# Patient Record
Sex: MALE | Employment: UNEMPLOYED | URBAN - METROPOLITAN AREA
[De-identification: names, ages, dates, MRNs, and addresses within clinical notes are randomized per-mention and may not be internally consistent; named-entity substitution may affect disease eponyms.]

---

## 2020-06-10 DIAGNOSIS — Z12.11 SCREEN FOR COLON CANCER: ICD-10-CM

## 2020-06-10 LAB
A/G RATIO: 1.1 (ref 1.1–2.2)
ALBUMIN SERPL-MCNC: 3.6 G/DL (ref 3.4–5)
ALP BLD-CCNC: 65 U/L (ref 40–129)
ALT SERPL-CCNC: 79 U/L (ref 10–40)
ANION GAP SERPL CALCULATED.3IONS-SCNC: 14 MMOL/L (ref 3–16)
AST SERPL-CCNC: 121 U/L (ref 15–37)
BASOPHILS ABSOLUTE: 0.1 K/UL (ref 0–0.2)
BASOPHILS RELATIVE PERCENT: 0.6 %
BILIRUB SERPL-MCNC: 0.7 MG/DL (ref 0–1)
BUN BLDV-MCNC: 4 MG/DL (ref 7–20)
CALCIUM SERPL-MCNC: 8.5 MG/DL (ref 8.3–10.6)
CHLORIDE BLD-SCNC: 102 MMOL/L (ref 99–110)
CHOLESTEROL, TOTAL: 164 MG/DL (ref 0–199)
CO2: 22 MMOL/L (ref 21–32)
CREAT SERPL-MCNC: 0.6 MG/DL (ref 0.9–1.3)
EOSINOPHILS ABSOLUTE: 0.1 K/UL (ref 0–0.6)
EOSINOPHILS RELATIVE PERCENT: 0.6 %
FOLATE: 7.23 NG/ML (ref 4.78–24.2)
GFR AFRICAN AMERICAN: >60
GFR NON-AFRICAN AMERICAN: >60
GLOBULIN: 3.3 G/DL
GLUCOSE BLD-MCNC: 118 MG/DL (ref 70–99)
HCT VFR BLD CALC: 47.2 % (ref 40.5–52.5)
HDLC SERPL-MCNC: 52 MG/DL (ref 40–60)
HEMOGLOBIN: 16 G/DL (ref 13.5–17.5)
LDL CHOLESTEROL CALCULATED: 95 MG/DL
LYMPHOCYTES ABSOLUTE: 2 K/UL (ref 1–5.1)
LYMPHOCYTES RELATIVE PERCENT: 24.2 %
MCH RBC QN AUTO: 36.2 PG (ref 26–34)
MCHC RBC AUTO-ENTMCNC: 33.9 G/DL (ref 31–36)
MCV RBC AUTO: 106.7 FL (ref 80–100)
MONOCYTES ABSOLUTE: 1 K/UL (ref 0–1.3)
MONOCYTES RELATIVE PERCENT: 12.5 %
NEUTROPHILS ABSOLUTE: 5.2 K/UL (ref 1.7–7.7)
NEUTROPHILS RELATIVE PERCENT: 62.1 %
PDW BLD-RTO: 13.3 % (ref 12.4–15.4)
PLATELET # BLD: 146 K/UL (ref 135–450)
PMV BLD AUTO: 9.2 FL (ref 5–10.5)
POTASSIUM SERPL-SCNC: 4 MMOL/L (ref 3.5–5.1)
PROSTATE SPECIFIC ANTIGEN: 0.83 NG/ML (ref 0–4)
RBC # BLD: 4.43 M/UL (ref 4.2–5.9)
SEDIMENTATION RATE, ERYTHROCYTE: 23 MM/HR (ref 0–20)
SODIUM BLD-SCNC: 138 MMOL/L (ref 136–145)
TOTAL PROTEIN: 6.9 G/DL (ref 6.4–8.2)
TRIGL SERPL-MCNC: 87 MG/DL (ref 0–150)
TSH SERPL DL<=0.05 MIU/L-ACNC: 2.44 UIU/ML (ref 0.27–4.2)
VITAMIN B-12: 529 PG/ML (ref 211–911)
VITAMIN D 25-HYDROXY: 8.4 NG/ML
VLDLC SERPL CALC-MCNC: 17 MG/DL
WBC # BLD: 8.4 K/UL (ref 4–11)

## 2020-06-11 LAB
ANA INTERPRETATION: ABNORMAL
ANA TITER: ABNORMAL
ANTI-NUCLEAR ANTIBODY (ANA): POSITIVE
ESTIMATED AVERAGE GLUCOSE: 93.9 MG/DL
HBA1C MFR BLD: 4.9 %

## 2020-07-29 ENCOUNTER — OFFICE VISIT (OUTPATIENT)
Dept: RHEUMATOLOGY | Age: 50
End: 2020-07-29
Payer: COMMERCIAL

## 2020-07-29 VITALS
BODY MASS INDEX: 31.52 KG/M2 | WEIGHT: 220.2 LBS | DIASTOLIC BLOOD PRESSURE: 78 MMHG | TEMPERATURE: 97.2 F | SYSTOLIC BLOOD PRESSURE: 100 MMHG | HEIGHT: 70 IN | HEART RATE: 103 BPM

## 2020-07-29 PROCEDURE — 99244 OFF/OP CNSLTJ NEW/EST MOD 40: CPT | Performed by: INTERNAL MEDICINE

## 2020-07-29 PROCEDURE — G8417 CALC BMI ABV UP PARAM F/U: HCPCS | Performed by: INTERNAL MEDICINE

## 2020-07-29 PROCEDURE — G8427 DOCREV CUR MEDS BY ELIG CLIN: HCPCS | Performed by: INTERNAL MEDICINE

## 2020-07-29 NOTE — LETTER
[facility] Rheumatology  [address] [address]  Phone: [phone]  Fax: [phone]    Gilberto Gann MD        July 29, 2020     Charmayne Hamper, MD  [address] [address]  Charmayne Hamper, MD  [phone]    Patient: Remi Barber  MR Number: <[id number]>  YOB: 1970  Date of Visit: 7/29/2020    Dear Dr. Charmayne Hamper:    Thank you for your referral. Progress note attached in visit summary. If you have questions, please do not hesitate to call me. I look forward to following Sergio Orn along with you.     Sincerely,        Gilberto Gann MD

## 2020-07-29 NOTE — PROGRESS NOTES
2020  Patient Name: Marlyn Manuel  : 1970  Medical Record: <[id number]>    MEDICATIONS  Current Outpatient Medications   Medication Sig Dispense Refill    vitamin D (ERGOCALCIFEROL) 1.25 MG (06900 UT) CAPS capsule Take 1 capsule by mouth once a week 4 capsule 5    aspirin EC 81 MG EC tablet Take 1 tablet by mouth daily 90 tablet 1     No current facility-administered medications for this visit. ALLERGIES  No Known Allergies      Comments  No specialty comments available. Gallo Mims MD    HISTORY OF PRESENT ILLNESS  Marlyn Manuel is a 45 y.o. male with past medical history of DVT who is being seen for follow up evaluation of  positive AYLEEN. 5 to 10 years ago he had given thrombosis in the left leg. He was on Coumadin for 1 year. He was told that he has lupus. He has not seen a rheumatologist before or been on any DMARD therapy. He denies malar rash, photosensitivity, oral/nasal ulcers, dry eyes/dry mouth, alopecia, history of kidney diseases, pleurisy or pericarditis, Raynaud's, sclerodactyly, skin thickening. He has on and off pain in his knees. Both knees crack and pop. He denies any joint swelling or stiffness. His mother and sister both have lupus. He had a blood work by PCP that showed low titer positive AYLEEN 1: 80, low vitamin D, elevated liver enzymes. He drinks 10-12  Drinks of alcohol every week. HPI  Review of Systems    REVIEW OF SYSTEMS:   Constitutional: No unanticipated weight loss or fevers. Integumentary: No rash, photosensitivity, malar rash, livedo reticularis, alopecia and Raynaud's symptoms, sclerodactyly, skin tightening  Eyes: negative for visual disturbance and persistent redness, discharge from eyes   ENT: - No tinnitus, loss of hearing, vertigo, or recurrent ear infections.  - No history of nasal/oral ulcers.   - No history of dry eyes/dry mouth  Cardiovascular: No history of pericarditis, chest pain or murmur or exudates. Respiratory:  No respiratory distress  GI:  Soft, nondistended, normal bowel sounds, nontender, noorganomegaly, no mass, no rebound, no guarding   :  No costovertebral angle tenderness   Integument:  Well hydrated, no rash or telangiectasias  Lymphatic:  No lymphadenopathy noted   Neurologic:   Alert & oriented x 3, CN 2-12 normal, no focal deficits noted. Sensations Intact. Muscle strength 5/5 proximallyand distally in upper and lower extremities.    Psychiatric:  Speech and behavior appropriate   Extremities: 1+ pedal edema in the right lower extremity up to the knee and 2+ pedal edema in the left lower extremity up to the knee        LABS AND IMAGING  Outside data reviewed and in HPI    Lab Results   Component Value Date    WBC 8.4 06/10/2020    RBC 4.43 06/10/2020    HGB 16.0 06/10/2020    HCT 47.2 06/10/2020     06/10/2020    .7 06/10/2020    MCH 36.2 06/10/2020    MCHC 33.9 06/10/2020    RDW 13.3 06/10/2020    LYMPHOPCT 24.2 06/10/2020    MONOPCT 12.5 06/10/2020    BASOPCT 0.6 06/10/2020    MONOSABS 1.0 06/10/2020    LYMPHSABS 2.0 06/10/2020    EOSABS 0.1 06/10/2020    BASOSABS 0.1 06/10/2020       Chemistry        Component Value Date/Time     06/10/2020 1045    K 4.0 06/10/2020 1045     06/10/2020 1045    CO2 22 06/10/2020 1045    BUN 4 (L) 06/10/2020 1045    CREATININE 0.6 (L) 06/10/2020 1045        Component Value Date/Time    CALCIUM 8.5 06/10/2020 1045    ALKPHOS 65 06/10/2020 1045     (H) 06/10/2020 1045    ALT 79 (H) 06/10/2020 1045    BILITOT 0.7 06/10/2020 1045          Lab Results   Component Value Date    SEDRATE 23 (H) 06/10/2020     No results found for: CRP  Lab Results   Component Value Date    AYLEEN POSITIVE 06/10/2020     No results found for: RF, CCPABIGG  Lab Results   Component Value Date    AYLEEN POSITIVE 06/10/2020    ANATITER 1:80 06/10/2020    ANAINT see below 06/10/2020     No results found for: DSDNAG, DSDNAIGGIFA  No results found for: referral to hepatologist for further evaluation  -     Comprehensive Metabolic Panel; Future  -     Hepatitis B Core Antibody, IgM; Future  -     Hepatitis B Surface Antigen; Future  -     Hepatitis C Antibody; Future         The patient indicates understanding of these issues and agrees with the plan. Return in about 2 weeks (around 8/12/2020). The risks and benefits of my recommendations, as well as other treatment options, benefits and side effects werediscussed with the patient. All questions were answered. I reviewed patient's history, referral documents and electronic medical records  Copy of consult note is being routedelectronically/faxed to referring physician         ######################################################################    I thank you for giving me theopportunity to participate in [facility]. If you have any questions or concerns please feel free to contact me. I look forward to following  Freddie Aguero along with you. Electronically signed by: Danay Maurer MD, 7/29/2020 9:51 AM    Documentation was done using voice recognition dragon software. Every effort was made to ensure accuracy;however, inadvertent unintentional computerized transcription errors may be present.

## 2020-07-31 DIAGNOSIS — R76.8 POSITIVE ANA (ANTINUCLEAR ANTIBODY): ICD-10-CM

## 2020-07-31 DIAGNOSIS — R74.8 ELEVATED LIVER ENZYMES: ICD-10-CM

## 2020-07-31 DIAGNOSIS — Z86.718 HISTORY OF DEEP VEIN THROMBOSIS (DVT) OF LOWER EXTREMITY: ICD-10-CM

## 2020-07-31 LAB
A/G RATIO: 1.1 (ref 1.1–2.2)
ALBUMIN SERPL-MCNC: 3.6 G/DL (ref 3.4–5)
ALP BLD-CCNC: 58 U/L (ref 40–129)
ALT SERPL-CCNC: 83 U/L (ref 10–40)
ANION GAP SERPL CALCULATED.3IONS-SCNC: 12 MMOL/L (ref 3–16)
ANTI-DSDNA IGG: <1 IU/ML (ref 0–9)
ANTI-RNP IGG: 1.2 AI (ref 0–0.9)
ANTI-SMITH IGG: <0.2 AI (ref 0–0.9)
ANTI-SS-A IGG: <0.2 AI (ref 0–0.9)
ANTI-SS-B IGG: <0.2 AI (ref 0–0.9)
AST SERPL-CCNC: 90 U/L (ref 15–37)
BILIRUB SERPL-MCNC: 0.9 MG/DL (ref 0–1)
BUN BLDV-MCNC: 5 MG/DL (ref 7–20)
C3 COMPLEMENT: 129.8 MG/DL (ref 90–180)
C4 COMPLEMENT: 19.3 MG/DL (ref 10–40)
CALCIUM SERPL-MCNC: 8.8 MG/DL (ref 8.3–10.6)
CHLORIDE BLD-SCNC: 97 MMOL/L (ref 99–110)
CO2: 25 MMOL/L (ref 21–32)
CREAT SERPL-MCNC: 0.6 MG/DL (ref 0.9–1.3)
GFR AFRICAN AMERICAN: >60
GFR NON-AFRICAN AMERICAN: >60
GLOBULIN: 3.4 G/DL
GLUCOSE BLD-MCNC: 108 MG/DL (ref 70–99)
HEPATITIS B CORE IGM ANTIBODY: NORMAL
HEPATITIS B SURFACE ANTIGEN INTERPRETATION: NORMAL
HEPATITIS C ANTIBODY INTERPRETATION: NORMAL
POTASSIUM SERPL-SCNC: 4.3 MMOL/L (ref 3.5–5.1)
SODIUM BLD-SCNC: 134 MMOL/L (ref 136–145)
TOTAL PROTEIN: 7 G/DL (ref 6.4–8.2)

## 2020-08-02 LAB
BETA-2 GLYCOPROTEIN 1 IGG ANTIBODY: 0 SGU (ref 0–20)
BETA-2 GLYCOPROTEIN 1 IGM ANTIBODY: 4 SMU (ref 0–20)

## 2020-08-03 LAB
ANTICARDIOLIPIN IGA ANTIBODY: 7 APL (ref 0–11)
ANTICARDIOLIPIN IGG ANTIBODY: 4 GPL (ref 0–14)
CARDIOLIPIN AB IGM: 1 MPL (ref 0–12)
DRVVT CONFIRMATION TEST: NORMAL RATIO
DRVVT SCREEN: 38 SEC (ref 33–44)
DRVVT,DIL: NORMAL SEC (ref 33–44)
HEXAGONAL PHOSPHOLIPID NEUTRALIZAT TEST: NORMAL
LUPUS ANTICOAG INTERP: NORMAL
PLT NEUTA: NORMAL
PT D: 14 SEC (ref 12–15.5)
PTT D: 40 SEC (ref 32–48)
PTT-D CORR REFLEX: NORMAL SEC (ref 32–48)
PTT-HEPARIN NEUTRALIZED: NORMAL SEC (ref 32–48)
REPTILASE TIME: NORMAL SEC
THROMBIN TIME: NORMAL SEC (ref 14.7–19.5)

## 2020-08-12 ENCOUNTER — VIRTUAL VISIT (OUTPATIENT)
Dept: RHEUMATOLOGY | Age: 50
End: 2020-08-12
Payer: COMMERCIAL

## 2020-08-12 PROCEDURE — 99213 OFFICE O/P EST LOW 20 MIN: CPT | Performed by: INTERNAL MEDICINE

## 2020-08-12 PROCEDURE — 3017F COLORECTAL CA SCREEN DOC REV: CPT | Performed by: INTERNAL MEDICINE

## 2020-08-12 PROCEDURE — G8427 DOCREV CUR MEDS BY ELIG CLIN: HCPCS | Performed by: INTERNAL MEDICINE

## 2020-08-12 NOTE — PROGRESS NOTES
who is being seen for follow up evaluation of  positive AYLEEN. 5 to 10 years ago he had given thrombosis in the left leg. He was on Coumadin for 1 year. He was told that he has lupus. He has not seen a rheumatologist before or been on any DMARD therapy. He denies malar rash, photosensitivity, oral/nasal ulcers, dry eyes/dry mouth, alopecia, history of kidney diseases, pleurisy or pericarditis, Raynaud's, sclerodactyly, skin thickening. He has on and off pain in his knees. Both knees crack and pop. He denies any joint swelling or stiffness. His mother and sister both have lupus. He had a blood work by PCP that showed low titer positive AYLEEN 1: 80, low vitamin D, elevated liver enzymes. He drinks 10-12  Drinks of alcohol every week. Interim history: He presents for follow-up of positive AYLEEN and positive RNP antibody. Blood work showed low titer positive AYLEEN 1: 80 and low titer positive RNP antibody. APL panel came back negative. All other serologies came back negative. He denies malar rash, photosensitivity, oral/nasal ulcers, dry eyes/dry mouth, alopecia, history of kidney diseases, pleurisy or pericarditis, Raynaud's, sclerodactyly, skin thickening. He has on and off pain in his knees. Both knees crack and pop. He denies any joint swelling or stiffness. His mother and sister both have lupus. HPI  Review of Systems    REVIEW OF SYSTEMS:   Constitutional: No unanticipated weight loss or fevers. Integumentary: No rash, photosensitivity, malar rash, livedo reticularis, alopecia and Raynaud's symptoms, sclerodactyly, skin tightening  Eyes: negative for visual disturbance and persistent redness, discharge from eyes   ENT: - No tinnitus, loss of hearing, vertigo, or recurrent ear infections.  - No history of nasal/oral ulcers.   - No history of dry eyes/dry mouth  Cardiovascular: No history of pericarditis, chest pain or murmur or palpitations  Respiratory: No shortness of breath, cough or history of interstitial lung disease. No history of pleurisy. No history of tuberculosis or atypical infections. Gastrointestinal: No history of heart burn, dysphagia or esophageal dysmotility. No change in bowel habits or any inflammatory bowel disease. Genitourinary: No history renal disease, miscarriages. Hematologic/Lymphatic: No abnormal bruising or bleeding,  swollen lymph nodes. Neurological: No history of headaches, seizure or focal weakness. No history of neuropathies, paresthesias or hyperesthesias, facial droop, diplopia  Psychiatric: No history of bipolar disease, anxiety, depression  Endocrine: Denies any polyuria, polydipsia and osteoporosis  Allergic/Immunologic: No nasal congestion or hives. I have reviewed patients Past medical History, Social History and Family History as mentioned in her chart and this remains unchanged fromprevious. Past Medical History:   Diagnosis Date    DVT (deep venous thrombosis) ([facility] [place] [address]      History reviewed. No pertinent surgical history. Social History     Socioeconomic History    Marital status:      Spouse name: Not on file    Number of children: Not on file    Years of education: Not on file    Highest education level: Not on file   Occupational History    Not on file   Social Needs    Financial resource strain: Not on file    Food insecurity     Worry: Not on file     Inability: Not on file    Transportation needs     Medical: Not on file     Non-medical: Not on file   Tobacco Use    Smoking status: Never Smoker    Smokeless tobacco: Never Used   Substance and Sexual Activity    Alcohol use:  Yes     Alcohol/week: 13.0 standard drinks     Types: 12 Cans of beer, 1 Shots of liquor per week     Frequency: 4 or more times a week     Drinks per session: 5 or 6    Drug use: Never    Sexual activity: Yes     Partners: Female   Lifestyle    Physical activity     Days per week: Not on file     Minutes per session: Not on file    Stress: Not on file Relationships    Social connections     Talks on phone: Not on file     Gets together: Not on file     Attends Jew service: Not on file     Active member of club or organization: Not on file     Attends meetings of clubs or organizations: Not on file     Relationship status: Not on file    Intimate partner violence     Fear of current or ex partner: Not on file     Emotionally abused: Not on file     Physically abused: Not on file     Forced sexual activity: Not on file   Other Topics Concern    Not on file   Social History Narrative    Not on file     Family History   Problem Relation Age of Onset    Lupus Mother     Lupus Sister        PHYSICAL EXAMINATION:  [ INSTRUCTIONS:  \"[x]\" Indicates a positive item  \"[]\" Indicates a negative item  -- DELETE ALL ITEMS NOT EXAMINED]  Vital Signs: (As obtained by patient/caregiver or practitioner observation)    Blood pressure-  Heart rate-    Respiratory rate-    Temperature-  Pulse oximetry-     Constitutional: [x] Appears well-developed and well-nourished [x] No apparent distress      [] Abnormal-   Mental status  [x] Alert and awake  [x] Oriented to person/place/time [x]Able to follow commands      Eyes:  EOM    [x]  Normal  [] Abnormal-  Sclera  [x]  Normal  [] Abnormal -         Discharge [x]  None visible  [] Abnormal -    HENT:   [x] Normocephalic, atraumatic.   [] Abnormal   [] Mouth/Throat: Mucous membranes are moist.     External Ears [x] Normal  [] Abnormal-     Neck: [x] No visualized mass     Pulmonary/Chest: [x] Respiratory effort normal.  [x] No visualized signs of difficulty breathing or respiratory distress        [] Abnormal-      Musculoskeletal:   [x] Normal gait with no signs of ataxia         [x] Normal range of motion of neck        [] Abnormal-       Neurological:        [x] No Facial Asymmetry (Cranial nerve 7 motor function) (limited exam to video visit)          [x] No gaze palsy        [] Abnormal-         Skin:        [x] No significant exanthematous lesions or discoloration noted on facial skin         [] Abnormal-            Psychiatric:       [x] Normal Affect [x] No Hallucinations        [] Abnormal-       LABS AND IMAGING  Outside data reviewed and in HPI    Lab Results   Component Value Date    WBC 8.4 06/10/2020    RBC 4.43 06/10/2020    HGB 16.0 06/10/2020    HCT 47.2 06/10/2020     06/10/2020    .7 06/10/2020    MCH 36.2 06/10/2020    MCHC 33.9 06/10/2020    RDW 13.3 06/10/2020    LYMPHOPCT 24.2 06/10/2020    MONOPCT 12.5 06/10/2020    BASOPCT 0.6 06/10/2020    MONOSABS 1.0 06/10/2020    LYMPHSABS 2.0 06/10/2020    EOSABS 0.1 06/10/2020    BASOSABS 0.1 06/10/2020       Chemistry        Component Value Date/Time     (L) 07/31/2020 1157    K 4.3 07/31/2020 1157    CL 97 (L) 07/31/2020 1157    CO2 25 07/31/2020 1157    BUN 5 (L) 07/31/2020 1157    CREATININE 0.6 (L) 07/31/2020 1157        Component Value Date/Time    CALCIUM 8.8 07/31/2020 1157    ALKPHOS 58 07/31/2020 1157    AST 90 (H) 07/31/2020 1157    ALT 83 (H) 07/31/2020 1157    BILITOT 0.9 07/31/2020 1157          Lab Results   Component Value Date    SEDRATE 23 (H) 06/10/2020     No results found for: CRP  Lab Results   Component Value Date    AYLEEN POSITIVE 06/10/2020    C3 129.8 07/31/2020    C4 19.3 07/31/2020     No results found for: RF, CCPABIGG  Lab Results   Component Value Date    AYLEEN POSITIVE 06/10/2020    ANATITER 1:80 06/10/2020    ANAINT see below 06/10/2020     No results found for: DSDNAG, DSDNAIGGIFA  No results found for: SSAROAB, SSALAAB  No results found for: SMAB, RNPAB  No results found for: CENTABIGG  Lab Results   Component Value Date    C3 129.8 07/31/2020    C4 19.3 07/31/2020     Lab Results   Component Value Date    VITD25 8.4 06/10/2020     No results found for: Demetrice Gilesrasheed  Lab Results   Component Value Date    PTVFDTWK31 529 06/10/2020     Lab Results   Component Value Date    TSH 2.44 06/10/2020     Lab Results Component Value Date    VITD25 8.4 (L) 06/10/2020       ASSESSMENT AND PLAN      Assessment/Plan:      ASSESSMENT:    1. Positive AYLEEN (antinuclear antibody)    2. Elevated liver enzymes    3. History of deep vein thrombosis (DVT) of lower extremity    4. Positive sm/RNP antibody        PLAN:   1. Positive AYLEEN (antinuclear antibody)  Low titer positive AYLEEN 1: 80. Other serologies are negative. No other signs and symptoms concerning for lupus. We will continue to monitor periodically    2. Elevated liver enzymes  Most likely due to alcohol use. Refer to hepatology    3. History of deep vein thrombosis (DVT) of lower extremity  History of DVT was on Coumadin for 1 year. No recurrence. APL panel negative    4. Positive sm/RNP antibody  Low titer positive AYLEEN and low titer positive RNP. Other serologies are negative. No other signs and symptoms concerning for lupus or systemic rheumatic disease. Most likely false positive test.  Will send to [address] [place] [address] [address]umi[address]ou [address]; Future        The patient indicates understanding of these issues and agrees with the plan. Return if symptoms worsen or fail to improve. The risks and benefits of my recommendations, as well as other treatment options, benefits and side effects werediscussed with the patient. All questions were answered. ######################################################################    I thank you for giving me theopportunity to participate in [facility]. If you have any questions or concerns please feel free to contact me. I look forward to following  Lincoln Saint along with you. Electronically signed by: Rashaad Helm MD, 8/12/2020 10:29 AM    Documentation was done using voice recognition dragon software. Every effort was made to ensure accuracy;however, inadvertent unintentional computerized transcription errors may be present.

## 2020-10-08 ENCOUNTER — HOSPITAL ENCOUNTER (OUTPATIENT)
Dept: ULTRASOUND IMAGING | Age: 50
Discharge: HOME | End: 2020-10-08
Payer: COMMERCIAL

## 2020-10-08 PROCEDURE — 76700 US EXAM ABDOM COMPLETE: CPT

## 2021-07-06 ENCOUNTER — HOSPITAL ENCOUNTER (OUTPATIENT)
Dept: VASCULAR LAB | Age: 51
Discharge: HOME | End: 2021-07-06
Payer: COMMERCIAL

## 2021-07-06 DIAGNOSIS — M79.89 SWELLING OF LOWER LEG: ICD-10-CM

## 2021-07-06 PROCEDURE — 93971 EXTREMITY STUDY: CPT

## 2021-07-07 ENCOUNTER — TELEPHONE (OUTPATIENT)
Dept: CARDIOLOGY CLINIC | Age: 51
End: 2021-07-07

## 2021-07-07 NOTE — PROGRESS NOTES
 Drug use: Never       No Known Allergies  Current Outpatient Medications   Medication Sig Dispense Refill    omeprazole (PRILOSEC) 40 MG delayed release capsule       aspirin 81 MG EC tablet Take 1 tablet by mouth daily 30 tablet 0    vitamin D (ERGOCALCIFEROL) 1.25 MG (26645 UT) CAPS capsule Take 1 capsule by mouth once a week 4 capsule 0     No current facility-administered medications for this visit. Review of Systems:  · Constitutional: No unanticipated weight loss. There's been no change in energy level, sleep pattern, or activity level. No fevers, chills. · Eyes: No visual changes or diplopia. No scleral icterus. · ENT: No Headaches, hearing loss or vertigo. No mouth sores or sore throat. · Cardiovascular: as reviewed in HPI  · Respiratory: No cough or wheezing, no sputum production. No hemoptysis. · Gastrointestinal: No abdominal pain, appetite loss, blood in stools. No change in bowel or bladder habits. · Genitourinary: No dysuria, trouble voiding, or hematuria. · Musculoskeletal:  No gait disturbance, no joint complaints. · Integumentary: No rash or pruritis. · Neurological: No headache, diplopia, change in muscle strength, numbness or tingling. · Psychiatric: No anxiety or depression. · Endocrine: No temperature intolerance. No excessive thirst, fluid intake, or urination. No tremor. · Hematologic/Lymphatic: No abnormal bruising or bleeding, blood clots or swollen lymph nodes. · Allergic/Immunologic: No nasal congestion or hives. Physical Exam:   /80 (Site: Left Upper Arm, Position: Sitting, Cuff Size: Medium Adult)   Pulse 104   Ht 5' 11\" (1.803 m)   Wt 203 lb (92.1 kg)   SpO2 97%   BMI 28.31 kg/m²   Wt Readings from Last 3 Encounters:   07/13/21 203 lb (92.1 kg)   07/09/21 202 lb (91.6 kg)   07/06/21 203 lb (92.1 kg)     Constitutional: He is oriented to person, place, and time. He appears well-developed and well-nourished. In no acute distress.    Head: Normocephalic and atraumatic. Pupils equal and round. Neck: Neck supple. No JVP or carotid bruit appreciated. No mass and no thyromegaly present. No lymphadenopathy present. Cardiovascular: Normal rate. Normal heart sounds. Exam reveals no gallop and no friction rub. No murmur heard. Pulmonary/Chest: Effort normal and breath sounds normal. No respiratory distress. He has no wheezes, rhonchi or rales. Abdominal: Soft, non-tender. Bowel sounds are normal. He exhibits no organomegaly, mass or bruit. Extremities: No edema. No cyanosis or clubbing. Pulses are 2+ radial/carotid bilaterally. Neurological: No gross cranial nerve deficit. Coordination normal.   Skin: Skin is warm and dry. There is no rash or diaphoresis. Psychiatric: He has a normal mood and affect. His speech is normal and behavior is normal.     Lab Review:   FLP:    Lab Results   Component Value Date    TRIG 87 06/10/2020    HDL 52 06/10/2020    LDLCALC 95 06/10/2020    LABVLDL 17 06/10/2020     BUN/Creatinine:    Lab Results   Component Value Date    BUN 5 07/31/2020    CREATININE 0.6 07/31/2020     PT/INR, TNI, HGB A1C:   Lab Results   Component Value Date/Time    LABA1C 4.9 06/10/2020 10:49 AM      No results found for: CBCAUTODIF    EKG Interpretation: 7/13/21, sinus rhythm with non-specific changes      Echo: ordered     Stress Test: ordered    CT Angiogram: 6/27/21  PULMONARY ARTERIES: Amedeo Nails is a suboptimal contrast bolus.  Within the confines of this examination, no central, lobar, segmental, or proximal subsegmental pulmonary emboli are identified. LUNGS/AIRWAYS:  Unremarkable.  No air space disease or mass   PLEURA: Unremarkable.  No pleural effusion or pneumothorax   MEDIASTINUM/KVNG:  Unremarkable   HEART/PERICARDIUM:  Normal in size with mild calcification of the left anterior descending coronary artery.  There is trace pericardial fluid/thickening without a significant pericardial effusion.    VESSELS:  Unremarkable.  No aneurysm CHEST WALL/LOWER NECK:  Unremarkable   UPPER ABDOMEN:  Described on concurrent CT abdomen and pelvis report   BONES:  Old healed posterior right seventh and eighth rib fractures   OTHER:  None       CT ABD/Pelvis: 6/27/21  FINDINGS:   LOWER CHEST:  Described on concurrent chest CT   LIVER:  Hepatic steatosis. GALLBLADDER/BILE DUCTS:  Trace pericholecystic fluid. No radiopaque gallstones. PANCREAS:  Unremarkable.  No mass or duct dilation   SPLEEN:  Unremarkable   ADRENALS:  Unremarkable     KIDNEYS/URETERS:  Right pelvic kidney with mild perinephric fat stranding bilaterally. No hydronephrosis. GI TRACT:  Unremarkable.  No obstruction or wall thickening   VESSELS:  Unremarkable.  No aneurysm or dissection   LYMPH NODES: Mildly prominent periportal lymph nodes are nonspecific but likely reactive. ABD WALL:  Unremarkable   PELVIS:  Unremarkable   BONES:  Unremarkable   OTHER:  None     IMPRESSION   Right pelvic kidney with mild perinephric fat stranding bilaterally, which could represent pyelonephritis in the appropriate clinical setting. Correlate with urinalysis. Trace pericholecystic fluid without radiopaque gallstones or gallbladder distention. If there are is ongoing clinical concern for acute cholecystitis, right upper quadrant ultrasound can be performed. Hepatic steatosis.     Venous Doppler: 7/6/21  Chronic partially occluding deep vein thrombosis involving the left    popliteal vein.    No other evidence of deep vein or superficial vein thrombosis involving the    left lower extremity and the right common femoral vein.    Calf and ankle edema noted.    Proximal calf veins were not well visualized on the left.    Incidental finding on the left: baker cyst measures 3 cm by 0.9 cm.           All above diagnostic testing and laboratory data was independently visualized and reviewed by me (not simply review of report)       Assessment and Plan   1) DVT  Chronic partially occluded left popliteal

## 2021-07-07 NOTE — TELEPHONE ENCOUNTER
New referral from Dr. Muna Heller with any of our doctors for abn EKG. Patient had Venous doppler lower yesterday at [address] 7/6    Please advise where to schedule New Patient.

## 2021-07-09 ENCOUNTER — OFFICE VISIT (OUTPATIENT)
Dept: ORTHOPEDIC SURGERY | Age: 51
End: 2021-07-09
Payer: COMMERCIAL

## 2021-07-09 VITALS — BODY MASS INDEX: 29.92 KG/M2 | HEIGHT: 69 IN | RESPIRATION RATE: 14 BRPM | WEIGHT: 202 LBS

## 2021-07-09 DIAGNOSIS — M17.12 PRIMARY OSTEOARTHRITIS OF LEFT KNEE: ICD-10-CM

## 2021-07-09 DIAGNOSIS — M70.42 PREPATELLAR BURSITIS OF LEFT KNEE: ICD-10-CM

## 2021-07-09 DIAGNOSIS — M25.562 LEFT KNEE PAIN, UNSPECIFIED CHRONICITY: ICD-10-CM

## 2021-07-09 DIAGNOSIS — I82.A22 DVT OF AXILLARY VEIN, CHRONIC, LEFT (HCC): Primary | ICD-10-CM

## 2021-07-09 DIAGNOSIS — M71.22 SYNOVIAL CYST OF LEFT POPLITEAL SPACE: ICD-10-CM

## 2021-07-09 PROCEDURE — G8417 CALC BMI ABV UP PARAM F/U: HCPCS | Performed by: ORTHOPAEDIC SURGERY

## 2021-07-09 PROCEDURE — 99243 OFF/OP CNSLTJ NEW/EST LOW 30: CPT | Performed by: ORTHOPAEDIC SURGERY

## 2021-07-09 PROCEDURE — G8427 DOCREV CUR MEDS BY ELIG CLIN: HCPCS | Performed by: ORTHOPAEDIC SURGERY

## 2021-07-09 NOTE — PROGRESS NOTES
Hannah Robison  <[id number]>  July 9, 2021    Chief Complaint   Patient presents with    Knee Pain     left       History: The patient is a 58-year-old gentleman who is here for evaluation of his left lower extremity. The patient reportedly has been developing increasing swelling in his left lower extremity over the past few months. The patient does have a history of lupus. He also has a history of DVT which was diagnosed back in 2009. He has had chronic left lower extremity swelling since that diagnosis. He is currently taking an aspirin a day. He reports minimal to no knee pain. 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 He denies any history of left knee injury. This is a consult from Abdulaziz Corona MD for the left knee. The patient's  past medical history, medications, allergies,  family history, social history, and have been reviewed, and dated and are recorded in the chart. Pertinent items are noted in HPI. Review of systems reviewed from Pertinent History Form dated on 7/9/2021 and available in the patient's chart under the Media tab. Vitals:  Resp 14   Ht 5' 9\" (1.753 m)   Wt 202 lb (91.6 kg)   BMI 29.83 kg/m²     Physical: Mr. Hannah Robison appears well, he is in no apparent distress, he demonstrates appropriate mood & affect. He is alert and oriented to person, place and time. Examination of the left lower extremity reveals no pain with range of motion of the hip. He has very mild medial joint line tenderness. He does have a palpable fullness in the posterior aspect of his knee consistent with a small Baker's cyst.  The patient does have chronic thickening and fullness to the prepatellar bursa consistent with chronic prepatellar bursitis. There is no evidence of erythema or warmth. Range of motion is from 0 degrees to 125 degrees. Strength is 4+ to 5/5 for all muscle groups about the left knee.  There is patellofemoral crepitus with range of motion of the left knee. Varus and valgus stressing of the knees reveals no evidence of instability. There is no effusion in the left knee. Anterior drawer and Lachman are negative bilaterally. Examination of the skin reveals no rashes, ulceration, or lesion, bilaterally in the lower extremities. Sensation to both lower extremities is grossly intact. Exam of both feet reveals pedal pulses intact and brisk cap refill. Patient is able to dorsiflex and wiggle all toes. Deep tendon reflexes of the lower extremities are normal and symmetric. The calf circumference of the left lower extremity is approximately twice the size of the right lower extremity. X-rays: 4 views of the left knee obtained in the office today were extensively reviewed. There is very mild medial joint space narrowing. There are no lytic or blastic lesions within the bone. Venous Doppler of the left lower extremity was extensively reviewed. There is evidence of a chronic partially occluding deep vein thrombosis involving the left popliteal vein. Impression: #1 small Baker's cyst left knee #2 mild left knee osteoarthritis #3 chronic DVT left lower extremity #4 chronic left knee prepatellar bursitis    Plan: At this time, we will refer the patient to Dr. Bennie Tapia for evaluation and treatment of the chronic DVT. He is relatively asymptomatic from his left knee. We will treat the left knee conservatively. The patient was instructed to call the office if he develops increasing pain and we will consider options including injection. The patient clearly will benefit from long-term compression stockings.

## 2021-07-13 ENCOUNTER — OFFICE VISIT (OUTPATIENT)
Dept: CARDIOLOGY CLINIC | Age: 51
End: 2021-07-13
Payer: COMMERCIAL

## 2021-07-13 VITALS
OXYGEN SATURATION: 97 % | DIASTOLIC BLOOD PRESSURE: 80 MMHG | HEIGHT: 71 IN | HEART RATE: 104 BPM | SYSTOLIC BLOOD PRESSURE: 132 MMHG | BODY MASS INDEX: 28.42 KG/M2 | WEIGHT: 203 LBS

## 2021-07-13 DIAGNOSIS — R07.9 CHEST PAIN, UNSPECIFIED TYPE: ICD-10-CM

## 2021-07-13 DIAGNOSIS — I82.532 CHRONIC DEEP VEIN THROMBOSIS (DVT) OF LEFT POPLITEAL VEIN (HCC): Primary | ICD-10-CM

## 2021-07-13 PROCEDURE — G8427 DOCREV CUR MEDS BY ELIG CLIN: HCPCS | Performed by: INTERNAL MEDICINE

## 2021-07-13 PROCEDURE — 93000 ELECTROCARDIOGRAM COMPLETE: CPT | Performed by: INTERNAL MEDICINE

## 2021-07-13 PROCEDURE — 1036F TOBACCO NON-USER: CPT | Performed by: INTERNAL MEDICINE

## 2021-07-13 PROCEDURE — G8417 CALC BMI ABV UP PARAM F/U: HCPCS | Performed by: INTERNAL MEDICINE

## 2021-07-13 PROCEDURE — 3017F COLORECTAL CA SCREEN DOC REV: CPT | Performed by: INTERNAL MEDICINE

## 2021-07-13 PROCEDURE — 99204 OFFICE O/P NEW MOD 45 MIN: CPT | Performed by: INTERNAL MEDICINE

## 2021-07-13 NOTE — PATIENT INSTRUCTIONS
Patient Education        Learning About Deep Vein Thrombosis  What is a deep vein thrombosis (DVT)? A deep vein thrombosis (DVT) is a blood clot (thrombus) in a deep vein, usually in the legs. A DVT can be dangerous because it can break loose and travel through the bloodstream to the lungs. There it can block blood flow in the lungs (pulmonary embolism). This can be life-threatening. What are the symptoms? Deep vein thrombosis often doesn't cause symptoms. Or it may cause only minor ones. When symptoms happen, they include:  · Swelling in the affected area of the leg or arm. · Redness and warmth in the affected area. · Pain or tenderness. You may have pain only when you touch the affected area or when you stand or walk. Sometimes a pulmonary embolism is the first sign that you have DVT. If your doctor thinks you may have DVT, you will probably have an ultrasound test. You may have other tests as well. What causes deep vein thrombosis (DVT)? Causes of a blood clot in a deep vein (DVT) include:  · Slowed blood flow. This can happen when you're not active for long periods of time. For example, clots can form if you are paralyzed, are confined to bed, or must sit while on a long flight or car trip. · Abnormal clotting problems that make the blood clot too easily or too quickly. This may be caused by certain health problems, such as cancer or a genetic clotting disorder. Pregnancy, hormonal birth control, and hormone therapy can also make blood more likely to clot. · Surgery or an injury to the blood vessels. Blood is more likely to clot in veins shortly after they are injured. How can you prevent DVT? · Exercise your lower leg muscles to help blood flow in your legs. Point your toes up toward your head so the calves of your legs are stretched, then relax and repeat. This is a good exercise to do when you are sitting for long periods of time.   · Get out of bed as soon as you can after an illness or surgery. If you need to stay in bed, do the leg exercise noted above every hour when you are awake. · Use special stockings called compression stockings. These stockings are tight at the feet with a gradually looser fit on the leg. Many doctors recommend that you wear compression stockings during a journey longer than 8 hours. · Take breaks when you are on long trips. Stop the car and walk around. On long airplane flights, walk up and down the aisle hourly, and flex and point your feet every 20 minutes while sitting. · Take blood-thinning medicines after some types of surgery if your doctor recommends it. Blood thinners also may be used if you are likely to develop clots. How is DVT treated? Treatment for DVT usually involves taking blood thinners. They prevent blood clots by increasing the time it takes a blood clot to form. They also help prevent existing blood clots from getting larger. Your doctor also may suggest that you prop up or elevate your leg or arm when possible, take several walks a day, and wear compression stockings. These measures may help reduce the pain and swelling that can happen with DVT. Follow-up care is a key part of your treatment and safety. Be sure to make and go to all appointments, and call your doctor if you are having problems. It's also a good idea to know your test results and keep a list of the medicines you take. Where can you learn more? Go to https://[employer]. org and sign in to your giftee account. Enter L359 in the [employer] box to learn more about \"Learning About Deep Vein Thrombosis. \"     If you do not have an account, please click on the \"Sign Up Now\" link. Current as of: November 4, 2020               Content Version: 12.9  © 1209-8568 [employer]wise, [employer]. Care instructions adapted under license by [facility] ([facility]).  If you have questions about a medical condition or this instruction, always ask your healthcare professional. [employer], [facility] disclaims any warranty or liability for your use of this information. Patient Education        Chest Pain: Care Instructions  Your Care Instructions     There are many things that can cause chest pain. Some are not serious and will get better on their own in a few days. But some kinds of chest pain need more testing and treatment. Your doctor may have recommended a follow-up visit in the next 8 to 12 hours. If you are not getting better, you may need more tests or treatment. Even though your doctor has released you, you still need to watch for any problems. The doctor carefully checked you, but sometimes problems can develop later. If you have new symptoms or if your symptoms do not get better, get medical care right away. If you have worse or different chest pain or pressure that lasts more than 5 minutes or you passed out (lost consciousness), call 911 or seek other emergency help right away. A medical visit is only one step in your treatment. Even if you feel better, you still need to do what your doctor recommends, such as going to all suggested follow-up appointments and taking medicines exactly as directed. This will help you recover and help prevent future problems. How can you care for yourself at home? · Rest until you feel better. · Take your medicine exactly as prescribed. Call your doctor if you think you are having a problem with your medicine. · Do not drive after taking a prescription pain medicine. When should you call for help? Call 911 if:     · You passed out (lost consciousness).     · You have severe difficulty breathing.     · You have symptoms of a heart attack. These may include:  ? Chest pain or pressure, or a strange feeling in your chest.  ? Sweating. ? Shortness of breath. ? Nausea or vomiting. ? Pain, pressure, or a strange feeling in your back, neck, jaw, or upper belly or in one or both shoulders or arms.   ? Lightheadedness or sudden

## 2021-08-09 ENCOUNTER — HOSPITAL ENCOUNTER (OUTPATIENT)
Dept: NON INVASIVE DIAGNOSTICS | Age: 51
Discharge: HOME | End: 2021-08-09
Payer: COMMERCIAL

## 2021-08-09 ENCOUNTER — HOSPITAL ENCOUNTER (OUTPATIENT)
Dept: NON INVASIVE DIAGNOSTICS | Age: 51
End: 2021-08-09
Payer: COMMERCIAL

## 2021-08-09 DIAGNOSIS — R07.9 CHEST PAIN, UNSPECIFIED TYPE: ICD-10-CM

## 2021-08-09 LAB
LV EF: 58 %
LVEF MODALITY: NORMAL

## 2021-08-09 PROCEDURE — 93306 TTE W/DOPPLER COMPLETE: CPT

## 2021-08-12 ENCOUNTER — HOSPITAL ENCOUNTER (OUTPATIENT)
Dept: NON INVASIVE DIAGNOSTICS | Age: 51
Discharge: HOME | End: 2021-08-12
Payer: COMMERCIAL

## 2021-08-12 LAB
LV EF: 70 %
LVEF MODALITY: NORMAL

## 2021-08-12 PROCEDURE — A9502 TC99M TETROFOSMIN: HCPCS | Performed by: INTERNAL MEDICINE

## 2021-08-12 PROCEDURE — 78452 HT MUSCLE IMAGE SPECT MULT: CPT

## 2021-08-12 PROCEDURE — 93017 CV STRESS TEST TRACING ONLY: CPT

## 2021-08-12 PROCEDURE — 3430000000 HC RX DIAGNOSTIC RADIOPHARMACEUTICAL: Performed by: INTERNAL MEDICINE

## 2021-08-12 RX ADMIN — TETROFOSMIN 10 MILLICURIE: 1.38 INJECTION, POWDER, LYOPHILIZED, FOR SOLUTION INTRAVENOUS at 08:18

## 2021-08-12 RX ADMIN — TETROFOSMIN 30 MILLICURIE: 1.38 INJECTION, POWDER, LYOPHILIZED, FOR SOLUTION INTRAVENOUS at 09:40

## 2021-08-21 NOTE — TELEPHONE ENCOUNTER
Patient can't do 12PM due to him working 3rd shift, he needs as early as possible. Patient is scheduled 07/13 at [address] [place] [address].  OK per Radha. never used

## 2021-10-13 ENCOUNTER — OFFICE VISIT (OUTPATIENT)
Dept: VASCULAR SURGERY | Age: 51
End: 2021-10-13
Payer: COMMERCIAL

## 2021-10-13 VITALS
SYSTOLIC BLOOD PRESSURE: 109 MMHG | HEIGHT: 70 IN | DIASTOLIC BLOOD PRESSURE: 71 MMHG | HEART RATE: 100 BPM | WEIGHT: 203 LBS | BODY MASS INDEX: 29.06 KG/M2

## 2021-10-13 DIAGNOSIS — M79.89 LEG SWELLING: Primary | ICD-10-CM

## 2021-10-13 PROCEDURE — G8427 DOCREV CUR MEDS BY ELIG CLIN: HCPCS | Performed by: SURGERY

## 2021-10-13 PROCEDURE — 99204 OFFICE O/P NEW MOD 45 MIN: CPT | Performed by: SURGERY

## 2021-10-13 PROCEDURE — 3017F COLORECTAL CA SCREEN DOC REV: CPT | Performed by: SURGERY

## 2021-10-13 PROCEDURE — 4004F PT TOBACCO SCREEN RCVD TLK: CPT | Performed by: SURGERY

## 2021-10-13 PROCEDURE — G8484 FLU IMMUNIZE NO ADMIN: HCPCS | Performed by: SURGERY

## 2021-10-13 PROCEDURE — G8417 CALC BMI ABV UP PARAM F/U: HCPCS | Performed by: SURGERY

## 2021-10-13 NOTE — PROGRESS NOTES
Subjective:      Patient ID: Ofe Rice is a 45 y.o. male. HPI Referral from Valentino Pimenta MD for evaluation of chronic left leg swelling with a previous history of DVT 2011. Patient has never worn compression stockings. He denies any history of ulceration, dermatitis or recurrent cellulitis. Anticoagulation in 2011 was continued for approximately 12 months only and he has not been on anticoagulation since that time. Personally spends a significant time on his legs as a [place] overnight at [employer] with a full-time position. Has noted that his leg has been enlarged ever since he had his DVT. Past Medical History:   Diagnosis Date    [facility]VT [facility] venous thrombosis) ([facility])     GERD (gastroesophageal reflux disease)      No past surgical history on file. No Known Allergies  Current Outpatient Medications   Medication Sig Dispense Refill    aspirin 81 MG EC tablet Take 1 tablet by mouth daily 30 tablet 0    vitamin D (ERGOCALCIFEROL) 1.25 MG (27225 UT) CAPS capsule Take 1 capsule by mouth once a week 4 capsule 0     No current facility-administered medications for this visit. Social History     Socioeconomic History    Marital status:      Spouse name: Not on file    Number of children: Not on file    Years of education: Not on file    Highest education level: Not on file   Occupational History    Not on file   Tobacco Use    Smoking status: Never Smoker    Smokeless tobacco: Current User     Types: Chew   Vaping Use    Vaping Use: Never used   Substance and Sexual Activity    Alcohol use:  Yes     Alcohol/week: 13.0 standard drinks     Types: 12 Cans of beer, 1 Shots of liquor per week    Drug use: Never    Sexual activity: Yes     Partners: Female   Other Topics Concern    Not on file   Social History Narrative    Not on file     Social Determinants of Health     Financial Resource Strain:     Difficulty of Paying Living Expenses:    Food Insecurity:     Worried About Running Out of Food in the Last Year:    [address] in the Last Year:    Transportation Needs:     Lack of Transportation (Medical):  Lack of Transportation (Non-Medical):    Physical Activity:     Days of Exercise per Week:     Minutes of Exercise per Session:    Stress:     Feeling of Stress :    Social Connections:     Frequency of Communication with Friends and Family:     Frequency of Social Gatherings with Friends and Family:     Attends Oriental orthodox Services:     Active Member of Clubs or Organizations:     Attends Club or Organization Meetings:     Marital Status:    Intimate Partner Violence:     Fear of Current or Ex-Partner:     Emotionally Abused:     Physically Abused:     Sexually Abused:      Family History   Problem Relation Age of Onset    Lupus Mother     Lupus Sister     Heart Attack Father          Review of Systems   All other systems reviewed and are negative. 15 point review systems confirmed personally by this MD.    Objective:   Physical Exam  Vitals and nursing note reviewed. Exam conducted with a chaperone present (wife). Constitutional:       Appearance: Normal appearance. HENT:      Head: Normocephalic and atraumatic. Right Ear: External ear normal.      Left Ear: External ear normal.      Nose: Nose normal.      Mouth/Throat:      Mouth: Mucous membranes are moist.      Pharynx: Oropharynx is clear. Eyes:      Extraocular Movements: Extraocular movements intact. Conjunctiva/sclera: Conjunctivae normal.   Cardiovascular:      Rate and Rhythm: Normal rate and regular rhythm. Heart sounds: Normal heart sounds. Pulmonary:      Effort: Pulmonary effort is normal.      Breath sounds: Normal breath sounds. Abdominal:      General: Bowel sounds are normal.      Palpations: Abdomen is soft. There is no mass. Hernia: No hernia is present. Musculoskeletal:      Cervical back: Normal range of motion and neck supple.       Right lower leg: No edema. Left lower leg: Edema (nonpitting - soft. Larger than R) present. Skin:     General: Skin is warm and dry. Capillary Refill: Capillary refill takes less than 2 seconds. Findings: No erythema, lesion or rash. Comments: Soft varicose veins L calf   Neurological:      General: No focal deficit present. Mental Status: He is alert and oriented to person, place, and time. Cranial Nerves: No cranial nerve deficit. Sensory: No sensory deficit. Motor: No weakness. Coordination: Coordination normal.      Gait: Gait normal.   Psychiatric:         Mood and Affect: Mood normal.         Behavior: Behavior normal.         Thought Content: Thought content normal.         Judgment: Judgment normal.        Pulses:   R bruit  L bruit   2   carotid 2    2   brachial 2    2   radial 2    2   femoral 2    2   popliteal 2    1   posterior tibial 1    2   dorsalis pedis 2    na   bypass graft na            Venous duplex 7/6/2021 - reviewed personally  Summary        Chronic partially occluding deep vein thrombosis involving the left    popliteal vein.    No other evidence of deep vein or superficial vein thrombosis involving the    left lower extremity and the right common femoral vein.    Calf and ankle edema noted.    Proximal calf veins were not well visualized on the left.    Incidental finding on the left: baker cyst measures 3 cm by 0.9 cm.           Assessment:      Chronic swelling L leg S/P DVT      Plan:      Begin knee-high 20/30 mmHg compression stockings on a daily basis to prevent progression to long-term sequelae of DVT. No plans for other work-up or intervention at this time. Stressed the need for compliance wearing every day especially at work and continuing until he is ready for bedtime. Prescription was provided and he will follow-up with me in the future as needed.

## 2021-10-13 NOTE — Clinical Note
Dr. Viktor Myles,    I saw Freddy Franks in the office today for evaluation of his left leg swelling. As you know this is chronic and likely as result of his previous DVT and 2011. Duplex scan earlier this year demonstrated expected chronic changes and there is no need for further work-up or intervention at this time. I have suggested that he begin compression stockings on a daily basis. He is agreeable to this and I will see him back in the future as needed. If you have any questions please feel free to contact me.     Ainsley Monge